# Patient Record
Sex: MALE | Race: WHITE | NOT HISPANIC OR LATINO | Employment: OTHER | URBAN - NONMETROPOLITAN AREA
[De-identification: names, ages, dates, MRNs, and addresses within clinical notes are randomized per-mention and may not be internally consistent; named-entity substitution may affect disease eponyms.]

---

## 2018-05-08 ENCOUNTER — APPOINTMENT (OUTPATIENT)
Dept: GENERAL RADIOLOGY | Age: 68
End: 2018-05-08
Attending: EMERGENCY MEDICINE

## 2018-05-08 ENCOUNTER — HOSPITAL ENCOUNTER (EMERGENCY)
Age: 68
Discharge: HOME | End: 2018-05-08
Attending: EMERGENCY MEDICINE

## 2018-05-08 ENCOUNTER — APPOINTMENT (OUTPATIENT)
Dept: CT IMAGING | Age: 68
End: 2018-05-08
Attending: EMERGENCY MEDICINE

## 2018-05-08 VITALS
WEIGHT: 235 LBS | SYSTOLIC BLOOD PRESSURE: 134 MMHG | TEMPERATURE: 98 F | HEIGHT: 71 IN | OXYGEN SATURATION: 95 % | RESPIRATION RATE: 16 BRPM | BODY MASS INDEX: 32.9 KG/M2 | HEART RATE: 60 BPM | DIASTOLIC BLOOD PRESSURE: 80 MMHG

## 2018-05-08 DIAGNOSIS — N23 RENAL COLIC ON RIGHT SIDE: Primary | ICD-10-CM

## 2018-05-08 LAB
ANION GAP BLD CALC-SCNC: 17 MMOL/L
BASOPHILS # BLD AUTO: 0 K/MCL (ref 0–0.3)
BASOPHILS NFR BLD AUTO: 0 %
BUN BLD-MCNC: 24 MG/DL (ref 6–20)
CA-I BLD ISE-SCNC: 1.1 MMOL/L (ref 1.15–1.29)
CHLORIDE BLD-SCNC: 105 MMOL/L (ref 98–107)
CO2 BLD-SCNC: 24 MMOL/L (ref 19–24)
CREAT BLD-MCNC: 0.9 MG/DL (ref 0.67–1.17)
CREAT BLD-MCNC: >90 MG/DL
DIFFERENTIAL METHOD BLD: NORMAL
EOSINOPHIL # BLD AUTO: 0.2 K/MCL (ref 0.1–0.5)
EOSINOPHIL NFR SPEC: 3 %
ERYTHROCYTE [DISTWIDTH] IN BLOOD: 14.2 % (ref 11–15)
GLUCOSE BLD-MCNC: 91 MG/DL (ref 65–99)
HCT VFR BLD CALC: 49.1 % (ref 39–51)
HCT VFR BLD CALC: 51 % (ref 39–51)
HGB BLD-MCNC: 17 G/DL (ref 13–17)
HGB BLD-MCNC: 17.3 G/DL (ref 13–17)
LYMPHOCYTES # BLD MANUAL: 1.8 K/MCL (ref 1–4)
LYMPHOCYTES NFR BLD MANUAL: 35 %
MCH RBC QN AUTO: 31 PG (ref 26–34)
MCHC RBC AUTO-ENTMCNC: 34.6 G/DL (ref 32–36.5)
MCV RBC AUTO: 89.6 FL (ref 78–100)
MONOCYTES # BLD MANUAL: 0.5 K/MCL (ref 0.3–0.9)
MONOCYTES NFR BLD MANUAL: 9 %
NEUTROPHILS # BLD: 2.8 K/MCL (ref 1.8–7.7)
NEUTROPHILS NFR BLD AUTO: 53 %
PLATELET # BLD: 158 K/MCL (ref 140–450)
POTASSIUM BLD-SCNC: 3.9 MMOL/L (ref 3.4–5.1)
RBC # BLD: 5.48 MIL/MCL (ref 4.5–5.9)
SODIUM BLD-SCNC: 141 MMOL/L (ref 135–145)
WBC # BLD: 5.2 K/MCL (ref 4.2–11)

## 2018-05-08 PROCEDURE — 10004180 CT ABDOMEN PELVIS FOR KIDNEY STONES

## 2018-05-08 PROCEDURE — 99284 EMERGENCY DEPT VISIT MOD MDM: CPT

## 2018-05-08 PROCEDURE — 74176 CT ABD & PELVIS W/O CONTRAST: CPT | Performed by: RADIOLOGY

## 2018-05-08 PROCEDURE — 10002807 HB RX 258: Performed by: EMERGENCY MEDICINE

## 2018-05-08 PROCEDURE — 36415 COLL VENOUS BLD VENIPUNCTURE: CPT

## 2018-05-08 PROCEDURE — 96374 THER/PROPH/DIAG INJ IV PUSH: CPT

## 2018-05-08 PROCEDURE — 99285 EMERGENCY DEPT VISIT HI MDM: CPT | Performed by: EMERGENCY MEDICINE

## 2018-05-08 PROCEDURE — 74018 RADEX ABDOMEN 1 VIEW: CPT

## 2018-05-08 PROCEDURE — 80047 BASIC METABLC PNL IONIZED CA: CPT

## 2018-05-08 PROCEDURE — 10002800 HB RX 250 W HCPCS: Performed by: EMERGENCY MEDICINE

## 2018-05-08 PROCEDURE — 85025 COMPLETE CBC W/AUTO DIFF WBC: CPT

## 2018-05-08 PROCEDURE — 74018 RADEX ABDOMEN 1 VIEW: CPT | Performed by: RADIOLOGY

## 2018-05-08 PROCEDURE — 96361 HYDRATE IV INFUSION ADD-ON: CPT

## 2018-05-08 PROCEDURE — 10004651 HB RX, NO CHARGE ITEM: Performed by: EMERGENCY MEDICINE

## 2018-05-08 RX ORDER — TAMSULOSIN HYDROCHLORIDE 0.4 MG/1
0.4 CAPSULE ORAL
Qty: 7 CAPSULE | Refills: 0 | Status: SHIPPED | OUTPATIENT
Start: 2018-05-08

## 2018-05-08 RX ORDER — HYDROCODONE BITARTRATE AND ACETAMINOPHEN 5; 325 MG/1; MG/1
1-2 TABLET ORAL EVERY 4 HOURS PRN
Qty: 12 TABLET | Refills: 0 | Status: SHIPPED | OUTPATIENT
Start: 2018-05-08

## 2018-05-08 RX ORDER — ONDANSETRON 4 MG/1
4 TABLET, ORALLY DISINTEGRATING ORAL EVERY 8 HOURS PRN
Qty: 10 TABLET | Refills: 0 | Status: SHIPPED | OUTPATIENT
Start: 2018-05-08

## 2018-05-08 RX ADMIN — SODIUM CHLORIDE 1000 ML: 9 INJECTION, SOLUTION INTRAVENOUS at 07:06

## 2018-05-08 RX ADMIN — KETOROLAC TROMETHAMINE 15 MG: 30 INJECTION, SOLUTION INTRAMUSCULAR at 07:12

## 2018-05-08 RX ADMIN — SODIUM CHLORIDE 2 ML: 9 INJECTION INTRAMUSCULAR; INTRAVENOUS; SUBCUTANEOUS at 07:09

## 2018-05-08 ASSESSMENT — ENCOUNTER SYMPTOMS
COUGH: 0
COLOR CHANGE: 0
BRUISES/BLEEDS EASILY: 0
EYE PAIN: 0
CONFUSION: 0
BACK PAIN: 0
EYE REDNESS: 0
LIGHT-HEADEDNESS: 0
WEAKNESS: 0
NERVOUS/ANXIOUS: 0
DIARRHEA: 0
VOMITING: 0
ABDOMINAL PAIN: 1
NAUSEA: 1
CHILLS: 0
HEADACHES: 0
FEVER: 0
NUMBNESS: 0
SORE THROAT: 0
SHORTNESS OF BREATH: 0
DIZZINESS: 0
ADENOPATHY: 0
CONSTIPATION: 0

## 2018-05-08 ASSESSMENT — PAIN SCALES - GENERAL
PAINLEVEL_OUTOF10: 6
PAIN_LEVEL_AT_REST: 7
PAINLEVEL_OUTOF10: 3

## 2018-10-09 ENCOUNTER — APPOINTMENT (OUTPATIENT)
Dept: GENERAL RADIOLOGY | Age: 68
End: 2018-10-09
Attending: EMERGENCY MEDICINE

## 2018-10-09 ENCOUNTER — HOSPITAL ENCOUNTER (EMERGENCY)
Age: 68
Discharge: HOME | End: 2018-10-09
Attending: EMERGENCY MEDICINE

## 2018-10-09 ENCOUNTER — APPOINTMENT (OUTPATIENT)
Dept: CT IMAGING | Age: 68
End: 2018-10-09
Attending: EMERGENCY MEDICINE

## 2018-10-09 VITALS
BODY MASS INDEX: 31.5 KG/M2 | HEART RATE: 56 BPM | DIASTOLIC BLOOD PRESSURE: 75 MMHG | OXYGEN SATURATION: 94 % | SYSTOLIC BLOOD PRESSURE: 133 MMHG | RESPIRATION RATE: 16 BRPM | TEMPERATURE: 97.9 F | HEIGHT: 71 IN | WEIGHT: 225 LBS

## 2018-10-09 DIAGNOSIS — N23 RENAL COLIC ON LEFT SIDE: ICD-10-CM

## 2018-10-09 DIAGNOSIS — R10.9 LEFT FLANK PAIN: Primary | ICD-10-CM

## 2018-10-09 LAB
ANION GAP BLD CALC-SCNC: 9 MMOL/L
APPEARANCE UR: ABNORMAL
BACTERIA #/AREA URNS HPF: ABNORMAL /HPF
BILIRUB UR QL STRIP: NEGATIVE
BUN BLD-MCNC: 25 MG/DL (ref 6–20)
CA-I BLD ISE-SCNC: 1.16 MMOL/L (ref 1.15–1.29)
CHLORIDE BLD-SCNC: 108 MMOL/L (ref 98–107)
CO2 BLD-SCNC: 26 MMOL/L (ref 19–24)
COLOR UR: YELLOW
CREAT BLD-MCNC: 0.9 MG/DL (ref 0.67–1.17)
CREAT BLD-MCNC: >90 MG/DL
GLUCOSE BLD-MCNC: 117 MG/DL (ref 65–99)
GLUCOSE UR STRIP-MCNC: NEGATIVE MG/DL
HCT VFR BLD CALC: 49 % (ref 39–51)
HGB BLD-MCNC: 16.7 G/DL (ref 13–17)
HGB UR QL STRIP: ABNORMAL
HYALINE CASTS #/AREA URNS LPF: ABNORMAL /LPF (ref 0–5)
KETONES UR STRIP-MCNC: NEGATIVE MG/DL
LEUKOCYTE ESTERASE UR QL STRIP: NEGATIVE
MUCOUS THREADS URNS QL MICRO: PRESENT
NITRITE UR QL STRIP: NEGATIVE
PH UR STRIP: 5.5 UNITS (ref 5–7)
POTASSIUM BLD-SCNC: 3.9 MMOL/L (ref 3.4–5.1)
PROT UR STRIP-MCNC: 30 MG/DL
RBC #/AREA URNS HPF: ABNORMAL /HPF (ref 0–3)
SODIUM BLD-SCNC: 138 MMOL/L (ref 135–145)
SP GR UR STRIP: >1.03 (ref 1–1.03)
SPECIMEN SOURCE: ABNORMAL
SQUAMOUS #/AREA URNS HPF: ABNORMAL /HPF (ref 0–5)
UROBILINOGEN UR STRIP-MCNC: 0.2 MG/DL (ref 0–1)
WBC #/AREA URNS HPF: ABNORMAL /HPF (ref 0–5)

## 2018-10-09 PROCEDURE — 74018 RADEX ABDOMEN 1 VIEW: CPT

## 2018-10-09 PROCEDURE — 96375 TX/PRO/DX INJ NEW DRUG ADDON: CPT

## 2018-10-09 PROCEDURE — 99284 EMERGENCY DEPT VISIT MOD MDM: CPT | Performed by: EMERGENCY MEDICINE

## 2018-10-09 PROCEDURE — 74018 RADEX ABDOMEN 1 VIEW: CPT | Performed by: RADIOLOGY

## 2018-10-09 PROCEDURE — 80047 BASIC METABLC PNL IONIZED CA: CPT

## 2018-10-09 PROCEDURE — 96361 HYDRATE IV INFUSION ADD-ON: CPT

## 2018-10-09 PROCEDURE — 36415 COLL VENOUS BLD VENIPUNCTURE: CPT

## 2018-10-09 PROCEDURE — 10002807 HB RX 258: Performed by: RADIOLOGY

## 2018-10-09 PROCEDURE — 10004180 CT ABDOMEN PELVIS

## 2018-10-09 PROCEDURE — 99284 EMERGENCY DEPT VISIT MOD MDM: CPT

## 2018-10-09 PROCEDURE — 81001 URINALYSIS AUTO W/SCOPE: CPT

## 2018-10-09 PROCEDURE — 10002800 HB RX 250 W HCPCS: Performed by: EMERGENCY MEDICINE

## 2018-10-09 PROCEDURE — 10002807 HB RX 258: Performed by: EMERGENCY MEDICINE

## 2018-10-09 PROCEDURE — 10002805 HB CONTRAST AGENT: Performed by: RADIOLOGY

## 2018-10-09 PROCEDURE — 96374 THER/PROPH/DIAG INJ IV PUSH: CPT

## 2018-10-09 PROCEDURE — 74177 CT ABD & PELVIS W/CONTRAST: CPT | Performed by: RADIOLOGY

## 2018-10-09 RX ORDER — ONDANSETRON 2 MG/ML
4 INJECTION INTRAMUSCULAR; INTRAVENOUS ONCE
Status: COMPLETED | OUTPATIENT
Start: 2018-10-09 | End: 2018-10-09

## 2018-10-09 RX ADMIN — KETOROLAC TROMETHAMINE 15 MG: 15 INJECTION, SOLUTION INTRAMUSCULAR; INTRAVENOUS at 16:06

## 2018-10-09 RX ADMIN — SODIUM CHLORIDE 100 ML: 9 INJECTION, SOLUTION INTRAVENOUS at 16:54

## 2018-10-09 RX ADMIN — SODIUM CHLORIDE 1000 ML: 9 INJECTION, SOLUTION INTRAVENOUS at 16:02

## 2018-10-09 RX ADMIN — IOPAMIDOL 100 ML: 612 INJECTION, SOLUTION INTRAVENOUS at 16:54

## 2018-10-09 RX ADMIN — ONDANSETRON 4 MG: 2 INJECTION INTRAMUSCULAR; INTRAVENOUS at 16:04

## 2018-10-09 ASSESSMENT — ENCOUNTER SYMPTOMS
VOMITING: 0
EYE REDNESS: 0
DIZZINESS: 0
COLOR CHANGE: 0
ADENOPATHY: 0
NUMBNESS: 0
WEAKNESS: 0
ABDOMINAL PAIN: 0
LIGHT-HEADEDNESS: 0
NERVOUS/ANXIOUS: 0
BRUISES/BLEEDS EASILY: 0
COUGH: 0
EYE PAIN: 0
HEADACHES: 0
SHORTNESS OF BREATH: 0
CHILLS: 0
CONFUSION: 0
BACK PAIN: 0
SORE THROAT: 0
CONSTIPATION: 0
FEVER: 0
DIARRHEA: 0
NAUSEA: 0

## 2018-10-09 ASSESSMENT — PAIN SCALES - GENERAL
PAINLEVEL_OUTOF10: 3
PAINLEVEL_OUTOF10: 1

## 2022-07-29 ENCOUNTER — TELEPHONE ENCOUNTER (OUTPATIENT)
Dept: URBAN - METROPOLITAN AREA CLINIC 9 | Facility: CLINIC | Age: 72
End: 2022-07-29

## 2022-07-30 ENCOUNTER — TELEPHONE ENCOUNTER (OUTPATIENT)
Age: 72
End: 2022-07-30

## 2022-07-31 ENCOUNTER — TELEPHONE ENCOUNTER (OUTPATIENT)
Age: 72
End: 2022-07-31

## 2022-08-02 ENCOUNTER — LAB OUTSIDE AN ENCOUNTER (OUTPATIENT)
Dept: URBAN - METROPOLITAN AREA CLINIC 7 | Facility: CLINIC | Age: 72
End: 2022-08-02

## 2022-08-02 ENCOUNTER — WEB ENCOUNTER (OUTPATIENT)
Dept: URBAN - METROPOLITAN AREA CLINIC 7 | Facility: CLINIC | Age: 72
End: 2022-08-02

## 2022-08-02 ENCOUNTER — OFFICE VISIT (OUTPATIENT)
Dept: URBAN - METROPOLITAN AREA CLINIC 7 | Facility: CLINIC | Age: 72
End: 2022-08-02
Payer: MEDICARE

## 2022-08-02 VITALS
BODY MASS INDEX: 29.96 KG/M2 | WEIGHT: 214 LBS | SYSTOLIC BLOOD PRESSURE: 126 MMHG | HEIGHT: 71 IN | DIASTOLIC BLOOD PRESSURE: 60 MMHG | TEMPERATURE: 98 F

## 2022-08-02 DIAGNOSIS — I48.21 PERMANENT ATRIAL FIBRILLATION: ICD-10-CM

## 2022-08-02 DIAGNOSIS — K75.81 NONALCOHOLIC STEATOHEPATITIS (NASH): ICD-10-CM

## 2022-08-02 DIAGNOSIS — K74.60 UNSPECIFIED CIRRHOSIS OF LIVER: ICD-10-CM

## 2022-08-02 DIAGNOSIS — Z86.010 HISTORY OF COLON POLYPS: ICD-10-CM

## 2022-08-02 DIAGNOSIS — E78.2 MODERATE MIXED HYPERLIPIDEMIA NOT REQUIRING STATIN THERAPY: ICD-10-CM

## 2022-08-02 PROCEDURE — 99204 OFFICE O/P NEW MOD 45 MIN: CPT | Performed by: INTERNAL MEDICINE

## 2022-08-02 RX ORDER — FUROSEMIDE 40 MG/1
TABLET ORAL
Qty: 90 TABLET | Status: ACTIVE | COMMUNITY

## 2022-08-02 RX ORDER — WARFARIN SODIUM 3 MG/1
TABLET ORAL
Qty: 30 TABLET | Status: ACTIVE | COMMUNITY

## 2022-08-02 RX ORDER — POTASSIUM CHLORIDE 20 MEQ/1
TABLET, EXTENDED RELEASE ORAL
Qty: 90 TABLET | Status: ACTIVE | COMMUNITY

## 2022-08-02 NOTE — HPI-TODAY'S VISIT:
Patient is seen today after a recent imaging study. While travelling recently noted some dyspnea sxs and was seen in ER with new dx of CHF,atrial fibrillation and possible pulmonary fibrosis.Now on coumadin. US with hepatic steatosis 2015 Fibroscan 7/2022 S0F4 CT without acute finding. MRI elastography stage 2-3. Years ago was noted by primary care to have elevated LFTs.Has long list of labs and review notable for prolonged elevation with AST>ALT Most recent 6/29/2022  ALT 74. No abdominal  pain. No fevers or chills. No night sweats. No weight loss recently but has lost weight over last years with caloric restriction. No nausea or vomiting. No dark urine or acholic stools. No change in bowel habits . No travel,sick contacts or new medications. No family history IBD,CRC No family hx liver disease. No signfidant alcohol use hx Metabolic syndrome risks include HT,HLD and elevated BMI. Distant blood transfusion after pelvic fracture 1994. Viral hepatits serologies negative

## 2022-08-10 ENCOUNTER — OFFICE VISIT (OUTPATIENT)
Dept: URBAN - METROPOLITAN AREA SURGERY CENTER 5 | Facility: SURGERY CENTER | Age: 72
End: 2022-08-10

## 2022-08-10 RX ORDER — WARFARIN SODIUM 3 MG/1
TABLET ORAL
Qty: 30 TABLET | Status: ACTIVE | COMMUNITY

## 2022-08-10 RX ORDER — POTASSIUM CHLORIDE 20 MEQ/1
TABLET, EXTENDED RELEASE ORAL
Qty: 90 TABLET | Status: ACTIVE | COMMUNITY

## 2022-08-10 RX ORDER — FUROSEMIDE 40 MG/1
TABLET ORAL
Qty: 90 TABLET | Status: ACTIVE | COMMUNITY

## 2022-09-23 ENCOUNTER — TELEPHONE ENCOUNTER (OUTPATIENT)
Dept: URBAN - METROPOLITAN AREA CLINIC 7 | Facility: CLINIC | Age: 72
End: 2022-09-23

## 2022-09-26 ENCOUNTER — OFFICE VISIT (OUTPATIENT)
Dept: URBAN - METROPOLITAN AREA SURGERY CENTER 5 | Facility: SURGERY CENTER | Age: 72
End: 2022-09-26

## 2022-10-05 ENCOUNTER — WEB ENCOUNTER (OUTPATIENT)
Dept: URBAN - METROPOLITAN AREA CLINIC 7 | Facility: CLINIC | Age: 72
End: 2022-10-05

## 2022-11-02 ENCOUNTER — OFFICE VISIT (OUTPATIENT)
Dept: URBAN - METROPOLITAN AREA CLINIC 7 | Facility: CLINIC | Age: 72
End: 2022-11-02
Payer: MEDICARE

## 2022-11-02 ENCOUNTER — DASHBOARD ENCOUNTERS (OUTPATIENT)
Age: 72
End: 2022-11-02

## 2022-11-02 ENCOUNTER — LAB OUTSIDE AN ENCOUNTER (OUTPATIENT)
Dept: URBAN - METROPOLITAN AREA CLINIC 7 | Facility: CLINIC | Age: 72
End: 2022-11-02

## 2022-11-02 VITALS
DIASTOLIC BLOOD PRESSURE: 58 MMHG | WEIGHT: 214 LBS | BODY MASS INDEX: 29.96 KG/M2 | SYSTOLIC BLOOD PRESSURE: 122 MMHG | HEIGHT: 71 IN | TEMPERATURE: 98 F

## 2022-11-02 DIAGNOSIS — K74.60 UNSPECIFIED CIRRHOSIS OF LIVER: ICD-10-CM

## 2022-11-02 DIAGNOSIS — I48.21 PERMANENT ATRIAL FIBRILLATION: ICD-10-CM

## 2022-11-02 DIAGNOSIS — E78.2 MODERATE MIXED HYPERLIPIDEMIA NOT REQUIRING STATIN THERAPY: ICD-10-CM

## 2022-11-02 DIAGNOSIS — J84.10 PULMONARY FIBROSIS: ICD-10-CM

## 2022-11-02 DIAGNOSIS — Z86.010 HISTORY OF COLON POLYPS: ICD-10-CM

## 2022-11-02 DIAGNOSIS — G47.34 IDIOPATHIC SLEEP RELATED NONOBSTRUCTIVE ALVEOLAR HYPOVENTILATION: ICD-10-CM

## 2022-11-02 DIAGNOSIS — K75.81 NONALCOHOLIC STEATOHEPATITIS (NASH): ICD-10-CM

## 2022-11-02 PROBLEM — 19943007: Status: ACTIVE | Noted: 2022-08-02

## 2022-11-02 PROBLEM — 266468003: Status: ACTIVE | Noted: 2022-08-02

## 2022-11-02 PROBLEM — 51615001: Status: ACTIVE | Noted: 2022-11-02

## 2022-11-02 PROBLEM — 441910000: Status: ACTIVE | Noted: 2022-11-02

## 2022-11-02 PROBLEM — 267434003: Status: ACTIVE | Noted: 2022-08-02

## 2022-11-02 PROBLEM — 428283002: Status: ACTIVE | Noted: 2022-08-02

## 2022-11-02 PROBLEM — 440028005: Status: ACTIVE | Noted: 2022-08-02

## 2022-11-02 PROCEDURE — 99214 OFFICE O/P EST MOD 30 MIN: CPT | Performed by: INTERNAL MEDICINE

## 2022-11-02 RX ORDER — RIVAROXABAN 20 MG/1
1 TABLET WITH FOOD TABLET, FILM COATED ORAL ONCE A DAY
Qty: 30 | Status: ACTIVE | COMMUNITY
Start: 2022-09-23

## 2022-11-02 RX ORDER — FUROSEMIDE 40 MG/1
TABLET ORAL
Qty: 90 TABLET | Status: ACTIVE | COMMUNITY

## 2022-11-02 RX ORDER — SPIRONOLACTONE 25 MG/1
1 TABLET TABLET, FILM COATED ORAL
Qty: 15 | Status: DISCONTINUED | COMMUNITY
Start: 2022-09-23 | End: 2022-10-23

## 2022-11-02 RX ORDER — WARFARIN SODIUM 3 MG/1
TABLET ORAL
Qty: 30 TABLET | Status: DISCONTINUED | COMMUNITY

## 2022-11-02 RX ORDER — CARVEDILOL 25 MG/1
1 TABLET WITH FOOD TABLET, FILM COATED ORAL TWICE A DAY
Qty: 60 | Status: DISCONTINUED | COMMUNITY
Start: 2022-09-23

## 2022-11-02 RX ORDER — POTASSIUM CHLORIDE 20 MEQ/1
TABLET, EXTENDED RELEASE ORAL
Qty: 90 TABLET | Status: ACTIVE | COMMUNITY

## 2022-11-02 NOTE — HPI-TODAY'S VISIT:
Seen back now. Has modified diet with resultant weight loss Taking milk thistle. Due for egd and banding once cleared by cardiology Fibroscan S0F4 Last seen today after a recent imaging study. While travelling recently noted some dyspnea sxs and was seen in ER with new dx of CHF,atrial fibrillation and possible pulmonary fibrosis.Now on coumadin. Has ongoing pulmonary eval in progress. Has been dx with LUI. US with hepatic steatosis 2015 Fibroscan 7/2022 S0F4 CT without acute finding. MRI elastography stage 2-3. Years ago was noted by primary care to have elevated LFTs.Has long list of labs and review notable for prolonged elevation with AST>ALT Most recent 6/29/2022  ALT 74. No abdominal  pain. No fevers or chills. No night sweats. No weight loss recently but has lost weight over last years with caloric restriction. No nausea or vomiting. No dark urine or acholic stools. No change in bowel habits . No travel,sick contacts or new medications. No family history IBD,CRC No family hx liver disease. No signfidant alcohol use hx Metabolic syndrome risks include HT,HLD and elevated BMI. Distant blood transfusion after pelvic fracture 1994. Viral hepatits serologies negative

## 2023-04-14 ENCOUNTER — TELEPHONE ENCOUNTER (OUTPATIENT)
Dept: URBAN - METROPOLITAN AREA CLINIC 7 | Facility: CLINIC | Age: 73
End: 2023-04-14

## 2023-05-05 ENCOUNTER — TELEPHONE ENCOUNTER (OUTPATIENT)
Dept: URBAN - METROPOLITAN AREA CLINIC 7 | Facility: CLINIC | Age: 73
End: 2023-05-05

## 2023-05-11 ENCOUNTER — OFFICE VISIT (OUTPATIENT)
Dept: URBAN - METROPOLITAN AREA SURGERY CENTER 5 | Facility: SURGERY CENTER | Age: 73
End: 2023-05-11

## 2024-05-03 ENCOUNTER — OFFICE VISIT (OUTPATIENT)
Dept: URBAN - METROPOLITAN AREA CLINIC 7 | Facility: CLINIC | Age: 74
End: 2024-05-03

## 2024-07-23 ENCOUNTER — OFFICE VISIT (OUTPATIENT)
Dept: URBAN - METROPOLITAN AREA CLINIC 7 | Facility: CLINIC | Age: 74
End: 2024-07-23

## 2024-10-15 ENCOUNTER — TELEPHONE ENCOUNTER (OUTPATIENT)
Dept: URBAN - METROPOLITAN AREA CLINIC 7 | Facility: CLINIC | Age: 74
End: 2024-10-15

## 2024-10-15 ENCOUNTER — LAB OUTSIDE AN ENCOUNTER (OUTPATIENT)
Dept: URBAN - METROPOLITAN AREA CLINIC 7 | Facility: CLINIC | Age: 74
End: 2024-10-15

## 2024-10-15 ENCOUNTER — OFFICE VISIT (OUTPATIENT)
Dept: URBAN - METROPOLITAN AREA CLINIC 7 | Facility: CLINIC | Age: 74
End: 2024-10-15
Payer: MEDICARE

## 2024-10-15 VITALS
DIASTOLIC BLOOD PRESSURE: 76 MMHG | OXYGEN SATURATION: 96 % | SYSTOLIC BLOOD PRESSURE: 120 MMHG | HEART RATE: 76 BPM | BODY MASS INDEX: 28.42 KG/M2 | HEIGHT: 71 IN | WEIGHT: 203 LBS | TEMPERATURE: 97 F

## 2024-10-15 DIAGNOSIS — K75.81 NONALCOHOLIC STEATOHEPATITIS (NASH): ICD-10-CM

## 2024-10-15 DIAGNOSIS — Z86.0100 HISTORY OF COLON POLYPS: ICD-10-CM

## 2024-10-15 DIAGNOSIS — K74.60 UNSPECIFIED CIRRHOSIS OF LIVER: ICD-10-CM

## 2024-10-15 DIAGNOSIS — E78.2 MODERATE MIXED HYPERLIPIDEMIA NOT REQUIRING STATIN THERAPY: ICD-10-CM

## 2024-10-15 PROBLEM — 1231000119100: Status: ACTIVE | Noted: 2024-10-15

## 2024-10-15 PROCEDURE — 99214 OFFICE O/P EST MOD 30 MIN: CPT | Performed by: INTERNAL MEDICINE

## 2024-10-15 RX ORDER — TORSEMIDE 20 MG/1
AS DIRECTED TABLET ORAL
Status: ACTIVE | COMMUNITY

## 2024-10-15 RX ORDER — TAMSULOSIN HYDROCHLORIDE 0.4 MG/1
CAPSULE ORAL
Qty: 60 CAPSULE | Status: ACTIVE | COMMUNITY

## 2024-10-15 RX ORDER — FUROSEMIDE 40 MG/1
TABLET ORAL
Qty: 90 TABLET | Status: ON HOLD | COMMUNITY

## 2024-10-15 RX ORDER — RIVAROXABAN 20 MG/1
1 TABLET WITH FOOD TABLET, FILM COATED ORAL ONCE A DAY
Qty: 30 | Status: ON HOLD | COMMUNITY
Start: 2022-09-23

## 2024-10-15 RX ORDER — POTASSIUM CHLORIDE 20 MEQ/1
TABLET, EXTENDED RELEASE ORAL
Qty: 90 TABLET | Status: ACTIVE | COMMUNITY

## 2024-10-15 RX ORDER — FINASTERIDE 5 MG/1
TAKE 1 TABLET BY MOUTH EVERY DAY TABLET, FILM COATED ORAL
Qty: 90 EACH | Refills: 0 | Status: ACTIVE | COMMUNITY

## 2024-10-15 RX ORDER — ALLOPURINOL 100 MG/1
TABLET ORAL
Qty: 90 TABLET | Status: ACTIVE | COMMUNITY

## 2024-10-15 RX ORDER — HYDRALAZINE HYDROCHLORIDE 25 MG/1
TABLET ORAL
Qty: 60 TABLET | Status: ACTIVE | COMMUNITY

## 2024-10-15 NOTE — HPI-TODAY'S VISIT:
Since last seen has undergone surgery with AVR ,maze and atrial clip placed Now off anticoagulation. Has modified diet with resultant aprox 15lb weight loss No longer taking  milk thistle but plans to reinitiate. Due for egd and banding once cleared by cardiology Fibroscan S0F4  Past hx significant for  dyspnea sxs and was seen in ER with new dx of CHF,atrial fibrillation and possible pulmonary fibrosis was on  coumadin. Has been dx with LUI. US with hepatic steatosis 2015 Fibroscan 7/2022 S0F4 CT without acute finding. MRI elastography stage 2-3. Years ago was noted by primary care to have elevated LFTs.Has long list of labs and review notable for prolonged elevation with AST>ALT Most recent 6/29/2022  ALT 74. No abdominal  pain. No fevers or chills. No night sweats. No weight loss recently but has lost weight over last years with caloric restriction. No nausea or vomiting. No dark urine or acholic stools. No change in bowel habits . No travel,sick contacts or new medications. No family history IBD,CRC No family hx liver disease. No signfidant alcohol use hx Metabolic syndrome risks include HT,HLD and elevated BMI. Distant blood transfusion after pelvic fracture 1994. Viral hepatits serologies negative

## 2025-02-04 ENCOUNTER — TELEPHONE ENCOUNTER (OUTPATIENT)
Dept: URBAN - METROPOLITAN AREA CLINIC 7 | Facility: CLINIC | Age: 75
End: 2025-02-04

## 2025-03-06 ENCOUNTER — OFFICE VISIT (OUTPATIENT)
Dept: URBAN - METROPOLITAN AREA SURGERY CENTER 5 | Facility: SURGERY CENTER | Age: 75
End: 2025-03-06

## 2025-04-15 ENCOUNTER — TELEPHONE ENCOUNTER (OUTPATIENT)
Dept: URBAN - METROPOLITAN AREA SURGERY CENTER 5 | Facility: SURGERY CENTER | Age: 75
End: 2025-04-15

## 2025-04-23 ENCOUNTER — TELEPHONE ENCOUNTER (OUTPATIENT)
Dept: URBAN - METROPOLITAN AREA CLINIC 7 | Facility: CLINIC | Age: 75
End: 2025-04-23

## 2025-05-01 ENCOUNTER — TELEPHONE ENCOUNTER (OUTPATIENT)
Dept: URBAN - METROPOLITAN AREA CLINIC 7 | Facility: CLINIC | Age: 75
End: 2025-05-01

## 2025-05-08 ENCOUNTER — LAB OUTSIDE AN ENCOUNTER (OUTPATIENT)
Dept: URBAN - METROPOLITAN AREA CLINIC 7 | Facility: CLINIC | Age: 75
End: 2025-05-08

## 2025-05-08 ENCOUNTER — TELEPHONE ENCOUNTER (OUTPATIENT)
Dept: URBAN - METROPOLITAN AREA CLINIC 7 | Facility: CLINIC | Age: 75
End: 2025-05-08

## 2025-05-13 ENCOUNTER — TELEPHONE ENCOUNTER (OUTPATIENT)
Dept: URBAN - METROPOLITAN AREA CLINIC 7 | Facility: CLINIC | Age: 75
End: 2025-05-13

## 2025-05-15 ENCOUNTER — WEB ENCOUNTER (OUTPATIENT)
Dept: URBAN - METROPOLITAN AREA CLINIC 7 | Facility: CLINIC | Age: 75
End: 2025-05-15

## 2025-05-15 ENCOUNTER — TELEPHONE ENCOUNTER (OUTPATIENT)
Dept: URBAN - METROPOLITAN AREA CLINIC 7 | Facility: CLINIC | Age: 75
End: 2025-05-15

## 2025-05-16 ENCOUNTER — CLAIMS CREATED FROM THE CLAIM WINDOW (OUTPATIENT)
Dept: URBAN - METROPOLITAN AREA SURGERY CENTER 5 | Facility: SURGERY CENTER | Age: 75
End: 2025-05-16
Payer: MEDICARE

## 2025-05-16 ENCOUNTER — WEB ENCOUNTER (OUTPATIENT)
Dept: URBAN - METROPOLITAN AREA CLINIC 7 | Facility: CLINIC | Age: 75
End: 2025-05-16

## 2025-05-16 DIAGNOSIS — K74.60 CIRRHOSIS: ICD-10-CM

## 2025-05-16 DIAGNOSIS — K74.60 HEPATIC CIRRHOSIS, UNSPECIFIED HEPATIC CIRRHOSIS TYPE, UNSPECIFIED WHETHER ASCITES PRESENT: ICD-10-CM

## 2025-05-16 PROCEDURE — 43235 EGD DIAGNOSTIC BRUSH WASH: CPT | Performed by: CLINIC/CENTER

## 2025-05-16 PROCEDURE — 00731 ANES UPR GI NDSC PX NOS: CPT | Performed by: NURSE ANESTHETIST, CERTIFIED REGISTERED

## 2025-05-16 PROCEDURE — 43235 EGD DIAGNOSTIC BRUSH WASH: CPT | Performed by: INTERNAL MEDICINE

## 2025-05-19 ENCOUNTER — WEB ENCOUNTER (OUTPATIENT)
Dept: URBAN - METROPOLITAN AREA CLINIC 7 | Facility: CLINIC | Age: 75
End: 2025-05-19

## 2025-05-22 ENCOUNTER — LAB OUTSIDE AN ENCOUNTER (OUTPATIENT)
Dept: URBAN - METROPOLITAN AREA SURGERY CENTER 5 | Facility: SURGERY CENTER | Age: 75
End: 2025-05-22

## 2025-05-23 ENCOUNTER — CLAIMS CREATED FROM THE CLAIM WINDOW (OUTPATIENT)
Dept: URBAN - METROPOLITAN AREA CLINIC 4 | Facility: CLINIC | Age: 75
End: 2025-05-23
Payer: MEDICARE

## 2025-05-23 ENCOUNTER — CLAIMS CREATED FROM THE CLAIM WINDOW (OUTPATIENT)
Dept: URBAN - METROPOLITAN AREA SURGERY CENTER 5 | Facility: SURGERY CENTER | Age: 75
End: 2025-05-23
Payer: MEDICARE

## 2025-05-23 DIAGNOSIS — Z86.0100 PERSONAL HISTORY OF COLON POLYPS, UNSPECIFIED: ICD-10-CM

## 2025-05-23 DIAGNOSIS — Z09 ENCOUNTER FOR COLONOSCOPY FOLLOWING COLON POLYP REMOVAL: ICD-10-CM

## 2025-05-23 DIAGNOSIS — K63.5 POLYP OF ASCENDING COLON, UNSPECIFIED TYPE: ICD-10-CM

## 2025-05-23 DIAGNOSIS — K63.89 OTHER SPECIFIED DISEASES OF INTESTINE: ICD-10-CM

## 2025-05-23 DIAGNOSIS — K63.5 COLON POLYP: ICD-10-CM

## 2025-05-23 PROCEDURE — 45385 COLONOSCOPY W/LESION REMOVAL: CPT | Performed by: INTERNAL MEDICINE

## 2025-05-23 PROCEDURE — 00811 ANES LWR INTST NDSC NOS: CPT | Performed by: NURSE ANESTHETIST, CERTIFIED REGISTERED

## 2025-05-23 PROCEDURE — 88302 TISSUE EXAM BY PATHOLOGIST: CPT | Performed by: PATHOLOGY

## 2025-05-23 PROCEDURE — 0529F INTRVL 3/>YR PTS CLNSCP DOCD: CPT | Performed by: INTERNAL MEDICINE

## 2025-05-23 PROCEDURE — 0529F INTRVL 3/>YR PTS CLNSCP DOCD: CPT | Performed by: CLINIC/CENTER

## 2025-05-23 PROCEDURE — 45385 COLONOSCOPY W/LESION REMOVAL: CPT | Performed by: CLINIC/CENTER

## 2025-05-23 RX ORDER — HYDRALAZINE HYDROCHLORIDE 25 MG/1
TABLET ORAL
Qty: 60 TABLET | Status: ACTIVE | COMMUNITY

## 2025-05-23 RX ORDER — TAMSULOSIN HYDROCHLORIDE 0.4 MG/1
CAPSULE ORAL
Qty: 60 CAPSULE | Status: ACTIVE | COMMUNITY

## 2025-05-23 RX ORDER — FINASTERIDE 5 MG/1
TAKE 1 TABLET BY MOUTH EVERY DAY TABLET, FILM COATED ORAL
Qty: 90 EACH | Refills: 0 | Status: ACTIVE | COMMUNITY

## 2025-05-23 RX ORDER — RIVAROXABAN 20 MG/1
1 TABLET WITH FOOD TABLET, FILM COATED ORAL ONCE A DAY
Qty: 30 | Status: ON HOLD | COMMUNITY
Start: 2022-09-23

## 2025-05-23 RX ORDER — ALLOPURINOL 100 MG/1
TABLET ORAL
Qty: 90 TABLET | Status: ACTIVE | COMMUNITY

## 2025-05-23 RX ORDER — FUROSEMIDE 40 MG/1
TABLET ORAL
Qty: 90 TABLET | Status: ON HOLD | COMMUNITY

## 2025-05-23 RX ORDER — TORSEMIDE 20 MG/1
AS DIRECTED TABLET ORAL
Status: ACTIVE | COMMUNITY

## 2025-05-23 RX ORDER — POTASSIUM CHLORIDE 20 MEQ/1
TABLET, EXTENDED RELEASE ORAL
Qty: 90 TABLET | Status: ACTIVE | COMMUNITY

## 2025-05-30 ENCOUNTER — WEB ENCOUNTER (OUTPATIENT)
Dept: URBAN - METROPOLITAN AREA CLINIC 7 | Facility: CLINIC | Age: 75
End: 2025-05-30

## 2025-06-03 ENCOUNTER — WEB ENCOUNTER (OUTPATIENT)
Dept: URBAN - METROPOLITAN AREA CLINIC 7 | Facility: CLINIC | Age: 75
End: 2025-06-03